# Patient Record
Sex: FEMALE | Race: AMERICAN INDIAN OR ALASKA NATIVE
[De-identification: names, ages, dates, MRNs, and addresses within clinical notes are randomized per-mention and may not be internally consistent; named-entity substitution may affect disease eponyms.]

---

## 2018-01-16 ENCOUNTER — HOSPITAL ENCOUNTER (EMERGENCY)
Dept: HOSPITAL 31 - C.ER | Age: 53
Discharge: HOME | End: 2018-01-16
Payer: COMMERCIAL

## 2018-01-16 VITALS
SYSTOLIC BLOOD PRESSURE: 132 MMHG | OXYGEN SATURATION: 99 % | HEART RATE: 66 BPM | DIASTOLIC BLOOD PRESSURE: 82 MMHG | RESPIRATION RATE: 16 BRPM

## 2018-01-16 VITALS — TEMPERATURE: 98.8 F

## 2018-01-16 VITALS — BODY MASS INDEX: 45.6 KG/M2

## 2018-01-16 DIAGNOSIS — M79.622: ICD-10-CM

## 2018-01-16 DIAGNOSIS — I10: Primary | ICD-10-CM

## 2018-01-16 LAB
ALBUMIN SERPL-MCNC: 4.2 G/DL (ref 3.5–5)
ALBUMIN/GLOB SERPL: 1.2 {RATIO} (ref 1–2.1)
ALT SERPL-CCNC: 27 U/L (ref 9–52)
APTT BLD: 33 SECONDS (ref 21–34)
AST SERPL-CCNC: 15 U/L (ref 14–36)
BACTERIA #/AREA URNS HPF: (no result) /[HPF]
BASOPHILS # BLD AUTO: 0.1 K/UL (ref 0–0.2)
BASOPHILS NFR BLD: 0.7 % (ref 0–2)
BILIRUB UR-MCNC: NEGATIVE MG/DL
BUN SERPL-MCNC: 11 MG/DL (ref 7–17)
CALCIUM SERPL-MCNC: 8.3 MG/DL (ref 8.6–10.4)
CK MB SERPL-MCNC: < 0.22 NG/ML (ref 0–3.38)
EOSINOPHIL # BLD AUTO: 0.2 K/UL (ref 0–0.7)
EOSINOPHIL NFR BLD: 2 % (ref 0–4)
ERYTHROCYTE [DISTWIDTH] IN BLOOD BY AUTOMATED COUNT: 14.4 % (ref 11.5–14.5)
GFR NON-AFRICAN AMERICAN: > 60
GLUCOSE UR STRIP-MCNC: NORMAL MG/DL
HGB BLD-MCNC: 12 G/DL (ref 11–16)
INR PPP: 1.1
LEUKOCYTE ESTERASE UR-ACNC: (no result) LEU/UL
LYMPHOCYTES # BLD AUTO: 3.8 K/UL (ref 1–4.3)
LYMPHOCYTES NFR BLD AUTO: 41.4 % (ref 20–40)
MCH RBC QN AUTO: 28.6 PG (ref 27–31)
MCHC RBC AUTO-ENTMCNC: 33.1 G/DL (ref 33–37)
MCV RBC AUTO: 86.4 FL (ref 81–99)
MONOCYTES # BLD: 0.6 K/UL (ref 0–0.8)
MONOCYTES NFR BLD: 6.2 % (ref 0–10)
NEUTROPHILS # BLD: 4.6 K/UL (ref 1.8–7)
NEUTROPHILS NFR BLD AUTO: 49.7 % (ref 50–75)
NRBC BLD AUTO-RTO: 0 % (ref 0–2)
PH UR STRIP: 5 [PH] (ref 5–8)
PLATELET # BLD: 289 K/UL (ref 130–400)
PMV BLD AUTO: 7.4 FL (ref 7.2–11.7)
PROT UR STRIP-MCNC: NEGATIVE MG/DL
PROTHROMBIN TIME: 12.5 SECONDS (ref 9.7–12.2)
RBC # BLD AUTO: 4.2 MIL/UL (ref 3.8–5.2)
RBC # UR STRIP: (no result) /UL
SP GR UR STRIP: 1.02 (ref 1–1.03)
SQUAMOUS EPITHIAL: 12 /HPF (ref 0–5)
URINE NITRATE: NEGATIVE
UROBILINOGEN UR-MCNC: NORMAL MG/DL (ref 0.2–1)
WBC # BLD AUTO: 9.3 K/UL (ref 4.8–10.8)

## 2018-01-16 NOTE — C.PDOC
History Of Present Illness





<Margot Murguia - Last Filed: 01/16/18 18:43>





<Lauri Gibson - Last Filed: 01/16/18 20:17>





Patient c/o HA that started today. Patient sts she checked her BP and found it 

to be elevated to 150/100. Patient sts her BP usually well controlled with 

Hyzaar. Patient sts she took Tylenol po that helped for headache. However while 

goind home from work, headache came back and associated with upper left arm 

pain. Patient denies SOB, Fever, Chest pain. Patient sts she called her PMD 

 who instructed to to be evaluated in ED. Currently patient denies 

headache, but left upper arm pain persists. (Margot Murguia)





<Margot Murguia - Last Filed: 01/16/18 18:43>





<PaigeAldavenkat - Last Filed: 01/16/18 20:17>


Chief Complaint (Nursing): High Blood Pressure





Past Medical History


Reviewed: Historical Data, Nursing Documentation, Vital Signs





- Medical History


PMH: HTN


Family History: States: Unknown Family Hx





- Social History


Hx Alcohol Use: No


Hx Substance Use: No





- Immunization History


Hx Tetanus Toxoid Vaccination: No


Hx Influenza Vaccination: No


Hx Pneumococcal Vaccination: No





<Margot Murguia - Last Filed: 01/16/18 18:43>


Vital Signs: 





 Last Vital Signs











Temp  98.8 F   01/16/18 20:10


 


Pulse  66   01/16/18 20:10


 


Resp  16   01/16/18 20:10


 


BP  132/82   01/16/18 20:10


 


Pulse Ox  99   01/16/18 20:10














Review Of Systems


Except As Marked, All Systems Reviewed And Found Negative.





<Margot Murguia - Last Filed: 01/16/18 18:43>





Physical Exam





- Physical Exam


Appears: Well, Non-toxic, No Acute Distress


Skin: Normal Color, No Diaphoretic, No Rash


Head: Atraumatic, Normacephalic


Eye(s): bilateral: Normal Inspection


Neck: Normal ROM, No Midline Cervical Tenderness, No Paracervical Tenderness


Chest: Symmetrical, No Deformity, No Tenderness


Cardiovascular: Rhythm Regular


Respiratory: Normal Breath Sounds, No Accessory Muscle Use


Gastrointestinal/Abdominal: Normal Exam, Soft, No Tenderness


Back: No Vertebral Tenderness, No Paraspinal Tenderness


Extremity: Normal ROM, No Tenderness, No Deformity, No Swelling


Extremity: Bilateral: Atraumatic


Pulses: Left Radial: Normal, Right Radial: Normal


Neurological/Psych: Oriented x3, Normal Speech, Normal Cognition





<Margot Murguia - Last Filed: 01/16/18 18:43>





ED Course And Treatment


O2 Sat by Pulse Oximetry: 98


Progress Note: Plan: cardiac work up





<Margot Murguia - Last Filed: 01/16/18 18:43>





- Laboratory Results


Result Diagrams: 


 01/16/18 19:29





 01/16/18 19:29


Pulse Ox Interpretation: Normal





- Radiology


CXR: Interpreted by Me, Viewed By Me


CXR Interpretation: No: Infiltrates, Fracture, Pnemothorax


Reevaluation Time: 20:17


Reassessment Condition: Improved





<Lauri Gibson - Last Filed: 01/16/18 20:17>





Disposition





- Disposition


Disposition Time: 18:52





<Margot Murguia - Last Filed: 01/16/18 18:43>


Counseled Patient/Family Regarding: Studies Performed, Diagnosis, Need For 

Followup





<Lauri Gibson - Last Filed: 01/16/18 20:17>





- Disposition


Referrals: 


Ayse Bentley MD [Staff Provider] - 


Disposition: HOME/ ROUTINE


Condition: FAIR


Additional Instructions: 


Please return if symptoms recur


Instructions:  Hypertension (DC), Arm Pain (ED)


Forms:  [facility] (English)





- Clinical Impression


Clinical Impression: 


 Hypertension, Arm pain, left








Physician Patient Turnover


Patient Signed Over To: Lauri Gibson


Handoff Comments: cardiac work up, dispo





<Margot Murguia - Last Filed: 01/16/18 18:43>

## 2018-01-17 NOTE — RAD
PROCEDURE:  CHEST RADIOGRAPH, 1 VIEW



HISTORY:

left arm pain, elevated BP



COMPARISON:

None available.



FINDINGS:



LUNGS:

No acute infiltrate is identified bilaterally.



PLEURA:

No pneumothorax or pleural fluid seen.



CARDIOVASCULAR:

Normal.



OSSEOUS STRUCTURES:

No significant abnormalities.



VISUALIZED UPPER ABDOMEN:

Normal.



OTHER FINDINGS:

None. 



IMPRESSION:

No acute cardiopulmonary disease appreciated.

## 2018-01-18 NOTE — CARD
--------------- APPROVED REPORT --------------





EKG Measurement

Heart [id number]

VT [id number]

[id number]

[id number]

[id number]



<Conclusion>

Normal sinus rhythm

Right bundle branch block

Abnormal ECG

## 2021-02-10 ENCOUNTER — ESTABLISHED COMPREHENSIVE EXAM (OUTPATIENT)
Dept: CLINIC 2 | Facility: CLINIC | Age: 56
End: 2021-02-10

## 2021-02-10 DIAGNOSIS — H52.4: ICD-10-CM

## 2021-02-10 DIAGNOSIS — E11.9: ICD-10-CM

## 2021-02-10 PROCEDURE — 92014 COMPRE OPH EXAM EST PT 1/>: CPT

## 2021-02-10 PROCEDURE — 92015 DETERMINE REFRACTIVE STATE: CPT

## 2021-02-10 ASSESSMENT — VISUAL ACUITY
OU_SC: J1
OS_SC: 20/25-
OD_SC: 20/20-1

## 2021-02-10 ASSESSMENT — TONOMETRY
OD_IOP_MMHG: 14
OS_IOP_MMHG: 13